# Patient Record
Sex: MALE | URBAN - METROPOLITAN AREA
[De-identification: names, ages, dates, MRNs, and addresses within clinical notes are randomized per-mention and may not be internally consistent; named-entity substitution may affect disease eponyms.]

---

## 2018-02-13 ENCOUNTER — IMPORTED ENCOUNTER (OUTPATIENT)
Dept: URBAN - METROPOLITAN AREA CLINIC 31 | Facility: CLINIC | Age: 63
End: 2018-02-13

## 2018-02-13 PROBLEM — H43.813: Noted: 2018-02-13

## 2018-02-13 PROBLEM — H25.813: Noted: 2018-02-13

## 2018-02-13 PROBLEM — D31.32: Noted: 2018-02-13

## 2018-02-13 PROCEDURE — 92250 FUNDUS PHOTOGRAPHY W/I&R: CPT

## 2018-02-13 PROCEDURE — 92014 COMPRE OPH EXAM EST PT 1/>: CPT

## 2018-02-13 PROCEDURE — 92015 DETERMINE REFRACTIVE STATE: CPT

## 2018-04-03 ENCOUNTER — IMPORTED ENCOUNTER (OUTPATIENT)
Dept: URBAN - METROPOLITAN AREA CLINIC 31 | Facility: CLINIC | Age: 63
End: 2018-04-03

## 2018-11-05 NOTE — PATIENT DISCUSSION
Nevus OS: Choroidal lesion meets the criteria specified by the COMS study for a benign lesion. Advise continued observation. Discussed in detail with the patient. The patient voiced understanding. IV intact/Arm band on

## 2019-10-15 ENCOUNTER — IMPORTED ENCOUNTER (OUTPATIENT)
Dept: URBAN - METROPOLITAN AREA CLINIC 31 | Facility: CLINIC | Age: 64
End: 2019-10-15

## 2019-10-15 PROBLEM — H25.813: Noted: 2019-10-15

## 2019-10-15 PROBLEM — D31.32: Noted: 2019-10-15

## 2019-10-15 PROBLEM — H43.813: Noted: 2019-10-15

## 2019-10-15 PROCEDURE — 92250 FUNDUS PHOTOGRAPHY W/I&R: CPT

## 2019-10-15 PROCEDURE — 92014 COMPRE OPH EXAM EST PT 1/>: CPT

## 2019-10-15 NOTE — PATIENT DISCUSSION
1. Combined Types of Cataract OU: Explained how cataracts can effect vision. Recommend clinical observation. The patient was advised to contact us if any change or worsening of vision. 2. PVD OU:  Patient was cautioned to call our office immediately if they experience a substantial change in their symptoms such as an increase in floaters persistent flashes loss of visual field (may appear as a shadow or a curtain) or decrease in visual acuity as these may indicate a retinal tear or detachment. If this is a new problem patient will need to return for re-examination  as determined by the [address] Nevus OS: Choroidal lesion meets the criteria specified by the COMS study for a benign lesion. Advise continued observation. Discussed in detail with the patient. The patient voiced understanding.

## 2021-02-23 ENCOUNTER — IMPORTED ENCOUNTER (OUTPATIENT)
Dept: URBAN - METROPOLITAN AREA CLINIC 31 | Facility: CLINIC | Age: 66
End: 2021-02-23

## 2021-02-23 PROBLEM — H43.812: Noted: 2021-02-23

## 2021-02-23 PROBLEM — D31.32: Noted: 2021-02-23

## 2021-02-23 PROBLEM — H25.813: Noted: 2021-02-23

## 2021-02-23 PROCEDURE — 92015 DETERMINE REFRACTIVE STATE: CPT

## 2021-02-23 PROCEDURE — 92250 FUNDUS PHOTOGRAPHY W/I&R: CPT

## 2021-02-23 PROCEDURE — 92014 COMPRE OPH EXAM EST PT 1/>: CPT

## 2022-04-02 ASSESSMENT — TONOMETRY
OS_IOP_MMHG: 14
OD_IOP_MMHG: 15
OS_IOP_MMHG: 16
OD_IOP_MMHG: 17
OS_IOP_MMHG: 18
OD_IOP_MMHG: 16

## 2022-04-02 ASSESSMENT — VISUAL ACUITY
OS_SC: J1+14''
OS_CC: 20/20
OS_SC: J1+16''
OS_SC: 20/20
OS_SC: 20/20-2
OS_SC: 20/25
OS_CC: J214''

## 2022-07-30 ENCOUNTER — TELEPHONE ENCOUNTER (OUTPATIENT)
Age: 67
End: 2022-07-30

## 2022-07-31 ENCOUNTER — TELEPHONE ENCOUNTER (OUTPATIENT)
Age: 67
End: 2022-07-31

## 2023-01-11 ENCOUNTER — COMPREHENSIVE EXAM (OUTPATIENT)
Dept: URBAN - METROPOLITAN AREA CLINIC 31 | Facility: CLINIC | Age: 68
End: 2023-01-11

## 2023-01-11 DIAGNOSIS — H31.001: ICD-10-CM

## 2023-01-11 DIAGNOSIS — H35.433: ICD-10-CM

## 2023-01-11 DIAGNOSIS — D31.32: ICD-10-CM

## 2023-01-11 DIAGNOSIS — H43.812: ICD-10-CM

## 2023-01-11 DIAGNOSIS — H25.813: ICD-10-CM

## 2023-01-11 PROCEDURE — 92014 COMPRE OPH EXAM EST PT 1/>: CPT

## 2023-01-11 PROCEDURE — 92250 FUNDUS PHOTOGRAPHY W/I&R: CPT

## 2023-01-11 ASSESSMENT — TONOMETRY
OS_IOP_MMHG: 16
OD_IOP_MMHG: 15

## 2023-01-11 ASSESSMENT — VISUAL ACUITY
OS_CC: J1+
OD_SC: 20/400
OS_CC: 20/20

## 2023-01-11 NOTE — PATIENT DISCUSSION
PVD OS: Patient was cautioned to call our office immediately if they experience a substantial change in their symptoms such as an increase in floaters persistent flashes loss of visual field (may appear as a shadow or a curtain) or decrease in visual acuity as these may indicate a retinal tear or detachment. If this is a new problem patient will need to return for re-examination  as determined by the physician.

## 2024-02-06 ENCOUNTER — COMPREHENSIVE EXAM (OUTPATIENT)
Dept: URBAN - METROPOLITAN AREA CLINIC 31 | Facility: CLINIC | Age: 69
End: 2024-02-06

## 2024-02-06 DIAGNOSIS — D31.32: ICD-10-CM

## 2024-02-06 DIAGNOSIS — H43.812: ICD-10-CM

## 2024-02-06 DIAGNOSIS — H25.813: ICD-10-CM

## 2024-02-06 PROCEDURE — 92014 COMPRE OPH EXAM EST PT 1/>: CPT

## 2024-02-06 ASSESSMENT — TONOMETRY
OS_IOP_MMHG: 14
OD_IOP_MMHG: 15

## 2024-02-06 ASSESSMENT — VISUAL ACUITY
OS_CC: J1
OS_CC: 20/20

## 2025-02-05 ENCOUNTER — COMPREHENSIVE EXAM (OUTPATIENT)
Age: 70
End: 2025-02-05

## 2025-02-05 DIAGNOSIS — H43.812: ICD-10-CM

## 2025-02-05 DIAGNOSIS — H52.4: ICD-10-CM

## 2025-02-05 DIAGNOSIS — H25.813: ICD-10-CM

## 2025-02-05 DIAGNOSIS — H35.433: ICD-10-CM

## 2025-02-05 DIAGNOSIS — D31.32: ICD-10-CM

## 2025-02-05 DIAGNOSIS — H31.001: ICD-10-CM

## 2025-02-05 PROCEDURE — 92015 DETERMINE REFRACTIVE STATE: CPT

## 2025-02-05 PROCEDURE — 92250 FUNDUS PHOTOGRAPHY W/I&R: CPT

## 2025-02-05 PROCEDURE — 92014 COMPRE OPH EXAM EST PT 1/>: CPT

## 2025-02-20 ENCOUNTER — FOLLOW UP (OUTPATIENT)
Age: 70
End: 2025-02-20

## 2025-02-20 DIAGNOSIS — H52.4: ICD-10-CM

## 2025-02-20 PROCEDURE — 92015GRNC REFRACTION GLASSES RECHECK - NO CHARGE

## 2025-06-10 ENCOUNTER — OFFICE VISIT (OUTPATIENT)
Dept: URBAN - METROPOLITAN AREA CLINIC 7 | Facility: CLINIC | Age: 70
End: 2025-06-10

## 2025-06-11 ENCOUNTER — TELEPHONE ENCOUNTER (OUTPATIENT)
Dept: URBAN - METROPOLITAN AREA CLINIC 7 | Facility: CLINIC | Age: 70
End: 2025-06-11

## 2025-06-11 ENCOUNTER — LAB OUTSIDE AN ENCOUNTER (OUTPATIENT)
Dept: URBAN - METROPOLITAN AREA CLINIC 7 | Facility: CLINIC | Age: 70
End: 2025-06-11

## 2025-06-11 ENCOUNTER — OFFICE VISIT (OUTPATIENT)
Dept: URBAN - METROPOLITAN AREA CLINIC 7 | Facility: CLINIC | Age: 70
End: 2025-06-11

## 2025-06-11 ENCOUNTER — OFFICE VISIT (OUTPATIENT)
Dept: URBAN - METROPOLITAN AREA CLINIC 7 | Facility: CLINIC | Age: 70
End: 2025-06-11
Payer: MEDICARE

## 2025-06-11 ENCOUNTER — DASHBOARD ENCOUNTERS (OUTPATIENT)
Age: 70
End: 2025-06-11

## 2025-06-11 DIAGNOSIS — R19.4 CHANGE IN BOWEL HABITS: ICD-10-CM

## 2025-06-11 DIAGNOSIS — Z79.01 CHRONIC ANTICOAGULATION: ICD-10-CM

## 2025-06-11 PROBLEM — 711150003: Status: ACTIVE | Noted: 2025-06-11

## 2025-06-11 PROCEDURE — 99214 OFFICE O/P EST MOD 30 MIN: CPT | Performed by: INTERNAL MEDICINE

## 2025-06-11 RX ORDER — DABIGATRAN ETEXILATE 150 MG/1
CAPSULE ORAL
Qty: 180 CAPSULE | Status: ACTIVE | COMMUNITY

## 2025-06-11 RX ORDER — BENAZEPRIL HYDROCHLORIDE 20 MG/1
TABLET, COATED ORAL
Qty: 90 TABLET | Status: ACTIVE | COMMUNITY

## 2025-06-11 RX ORDER — AMLODIPINE BESYLATE 10 MG/1
TABLET ORAL
Qty: 90 TABLET | Status: ACTIVE | COMMUNITY

## 2025-06-11 RX ORDER — ATENOLOL AND CHLORTHALIDONE 100; 25 MG/1; MG/1
TABLET ORAL
Qty: 90 TABLET | Status: ACTIVE | COMMUNITY

## 2025-06-11 RX ORDER — DIGOXIN 125 UG/1
TABLET ORAL
Qty: 90 TABLET | Status: ACTIVE | COMMUNITY

## 2025-06-11 NOTE — HPI-TODAY'S VISIT:
The patient is a 70-year-old who presents with change in bowel habits.  He has developed acute constipation was started on lubiprostone and had good bowel movements but then developed diarrhea.  Also lubiprostone was too expensive for him to afford.  He was also given a prescription for Movantik and lactulose.  He is currently bloated and has sensation of incomplete evacuation.  His last colonoscopy in 2019 showed hemorrhoids and diverticulosis but otherwise unremarkable.  He is on chronic anticoagulation for atrial fibrillation with Pradaxa.
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
## 2025-07-03 ENCOUNTER — OFFICE VISIT (OUTPATIENT)
Dept: URBAN - METROPOLITAN AREA SURGERY CENTER 5 | Facility: SURGERY CENTER | Age: 70
End: 2025-07-03
Payer: MEDICARE

## 2025-07-03 ENCOUNTER — CLAIMS CREATED FROM THE CLAIM WINDOW (OUTPATIENT)
Dept: URBAN - METROPOLITAN AREA CLINIC 4 | Facility: CLINIC | Age: 70
End: 2025-07-03
Payer: MEDICARE

## 2025-07-03 DIAGNOSIS — D17.5 BENIGN LIPOMATOUS NEOPLASM OF INTRA-ABDOMINAL ORGANS: ICD-10-CM

## 2025-07-03 DIAGNOSIS — K64.8 OTHER HEMORRHOIDS: ICD-10-CM

## 2025-07-03 DIAGNOSIS — K63.89 OTHER SPECIFIED DISEASES OF INTESTINE: ICD-10-CM

## 2025-07-03 DIAGNOSIS — K57.30 DIVERTICULOSIS OF LARGE INTESTINE WITHOUT PERFORATION OR ABSCESS WITHOUT BLEEDING: ICD-10-CM

## 2025-07-03 DIAGNOSIS — R19.4 CHANGE IN BOWEL HABITS: ICD-10-CM

## 2025-07-03 PROCEDURE — 00811 ANES LWR INTST NDSC NOS: CPT | Performed by: NURSE ANESTHETIST, CERTIFIED REGISTERED

## 2025-07-03 PROCEDURE — 88305 TISSUE EXAM BY PATHOLOGIST: CPT | Performed by: PATHOLOGY

## 2025-07-03 PROCEDURE — 45380 COLONOSCOPY AND BIOPSY: CPT | Performed by: INTERNAL MEDICINE

## 2025-07-03 PROCEDURE — 45380 COLONOSCOPY AND BIOPSY: CPT | Performed by: CLINIC/CENTER

## 2025-07-03 RX ORDER — DABIGATRAN ETEXILATE 150 MG/1
CAPSULE ORAL
Qty: 180 CAPSULE | Status: ACTIVE | COMMUNITY

## 2025-07-03 RX ORDER — ATENOLOL AND CHLORTHALIDONE 100; 25 MG/1; MG/1
TABLET ORAL
Qty: 90 TABLET | Status: ACTIVE | COMMUNITY

## 2025-07-03 RX ORDER — BENAZEPRIL HYDROCHLORIDE 20 MG/1
TABLET, COATED ORAL
Qty: 90 TABLET | Status: ACTIVE | COMMUNITY

## 2025-07-03 RX ORDER — AMLODIPINE BESYLATE 10 MG/1
TABLET ORAL
Qty: 90 TABLET | Status: ACTIVE | COMMUNITY

## 2025-07-03 RX ORDER — DIGOXIN 125 UG/1
TABLET ORAL
Qty: 90 TABLET | Status: ACTIVE | COMMUNITY

## 2025-08-29 ENCOUNTER — OFFICE VISIT (OUTPATIENT)
Dept: URBAN - METROPOLITAN AREA CLINIC 7 | Facility: CLINIC | Age: 70
End: 2025-08-29
Payer: MEDICARE

## 2025-08-29 DIAGNOSIS — K59.09 CHRONIC CONSTIPATION: ICD-10-CM

## 2025-08-29 PROCEDURE — 99214 OFFICE O/P EST MOD 30 MIN: CPT | Performed by: INTERNAL MEDICINE

## 2025-08-29 RX ORDER — BENAZEPRIL HYDROCHLORIDE 20 MG/1
TABLET, COATED ORAL
Qty: 90 TABLET | Status: ACTIVE | COMMUNITY

## 2025-08-29 RX ORDER — ATENOLOL AND CHLORTHALIDONE 100; 25 MG/1; MG/1
TABLET ORAL
Qty: 90 TABLET | Status: ACTIVE | COMMUNITY

## 2025-08-29 RX ORDER — DIGOXIN 125 UG/1
TABLET ORAL
Qty: 90 TABLET | Status: ACTIVE | COMMUNITY

## 2025-08-29 RX ORDER — LUBIPROSTONE 24 UG/1
1 CAPSULE WITH FOOD AND WATER CAPSULE, GELATIN COATED ORAL TWICE A DAY
Qty: 60 | Refills: 5 | OUTPATIENT
Start: 2025-08-29 | End: 2026-02-25

## 2025-08-29 RX ORDER — DABIGATRAN ETEXILATE 150 MG/1
CAPSULE ORAL
Qty: 180 CAPSULE | Status: ACTIVE | COMMUNITY

## 2025-08-29 RX ORDER — AMLODIPINE BESYLATE 10 MG/1
TABLET ORAL
Qty: 90 TABLET | Status: ACTIVE | COMMUNITY